# Patient Record
Sex: FEMALE | Race: WHITE | NOT HISPANIC OR LATINO | Employment: OTHER | URBAN - METROPOLITAN AREA
[De-identification: names, ages, dates, MRNs, and addresses within clinical notes are randomized per-mention and may not be internally consistent; named-entity substitution may affect disease eponyms.]

---

## 2017-05-02 ENCOUNTER — ALLSCRIPTS OFFICE VISIT (OUTPATIENT)
Dept: OTHER | Facility: OTHER | Age: 72
End: 2017-05-02

## 2017-05-02 DIAGNOSIS — Z12.31 ENCOUNTER FOR SCREENING MAMMOGRAM FOR MALIGNANT NEOPLASM OF BREAST: ICD-10-CM

## 2018-01-12 VITALS
BODY MASS INDEX: 29.44 KG/M2 | WEIGHT: 160 LBS | SYSTOLIC BLOOD PRESSURE: 124 MMHG | HEIGHT: 62 IN | DIASTOLIC BLOOD PRESSURE: 76 MMHG

## 2018-05-09 ENCOUNTER — ANNUAL EXAM (OUTPATIENT)
Dept: OBGYN CLINIC | Facility: MEDICAL CENTER | Age: 73
End: 2018-05-09
Payer: MEDICARE

## 2018-05-09 VITALS
DIASTOLIC BLOOD PRESSURE: 70 MMHG | BODY MASS INDEX: 29.3 KG/M2 | SYSTOLIC BLOOD PRESSURE: 128 MMHG | WEIGHT: 159.2 LBS | HEIGHT: 62 IN

## 2018-05-09 DIAGNOSIS — Z01.419 ENCNTR FOR GYN EXAM (GENERAL) (ROUTINE) W/O ABN FINDINGS: Primary | ICD-10-CM

## 2018-05-09 DIAGNOSIS — Z12.31 ENCOUNTER FOR SCREENING MAMMOGRAM FOR MALIGNANT NEOPLASM OF BREAST: ICD-10-CM

## 2018-05-09 DIAGNOSIS — Z78.0 POSTMENOPAUSAL: ICD-10-CM

## 2018-05-09 PROCEDURE — G0101 CA SCREEN;PELVIC/BREAST EXAM: HCPCS | Performed by: OBSTETRICS & GYNECOLOGY

## 2018-05-09 RX ORDER — TRIMETHOPRIM 100 MG/1
TABLET ORAL
COMMUNITY
Start: 2011-09-06 | End: 2019-09-10

## 2018-05-09 RX ORDER — FENOFIBRATE 160 MG/1
TABLET ORAL
COMMUNITY

## 2018-05-09 RX ORDER — PERPHENAZINE/AMITRIPTYLINE HCL 2 MG-25 MG
TABLET ORAL
COMMUNITY

## 2018-05-09 RX ORDER — VITAMIN E (DL,TOCOPHERYL ACET) 180 MG
CAPSULE ORAL
COMMUNITY

## 2018-05-09 RX ORDER — MULTIVITAMIN WITH IRON
1 TABLET ORAL DAILY
COMMUNITY

## 2018-05-09 RX ORDER — LISINOPRIL 20 MG/1
TABLET ORAL
COMMUNITY
Start: 2011-07-11

## 2018-05-09 RX ORDER — AZELASTINE 1 MG/ML
1 SPRAY, METERED NASAL 2 TIMES DAILY
COMMUNITY

## 2018-05-09 RX ORDER — FLUOCINONIDE 1 MG/G
CREAM TOPICAL 2 TIMES DAILY
COMMUNITY
Start: 2016-04-01 | End: 2019-09-10

## 2018-05-09 RX ORDER — OMEPRAZOLE 20 MG/1
CAPSULE, DELAYED RELEASE ORAL
COMMUNITY
Start: 2011-06-16

## 2018-05-09 RX ORDER — CHLORAL HYDRATE 500 MG
CAPSULE ORAL
COMMUNITY

## 2018-05-09 RX ORDER — MAG HYDROX/ALUMINUM HYD/SIMETH 400-400-40
SUSPENSION, ORAL (FINAL DOSE FORM) ORAL
COMMUNITY
End: 2019-05-14

## 2018-05-09 NOTE — PROGRESS NOTES
ASSESSMENT & PLAN: Keshia was seen today for gynecologic exam     Diagnoses and all orders for this visit:    Encntr for gyn exam (general) (routine) w/o abn findings    Encounter for screening mammogram for malignant neoplasm of breast  -     Mammo screening bilateral w cad; Future    Postmenopausal  -     DXA bone density spine hip and pelvis; Future    Discussion/Summary: reviewed potential gyn emergencies; Rxs given for screening mammogram and DEXA Scan; due to history of left breast cancer, recommend yearly visits  1  Routine well woman exam done today  2   Pap and HPV:  Pap and cotesting was not done today  Current ASCCP Guidelines reviewed  3   Mammogram was ordered  4   Colonoscopy is up to date  5   DEXA is not up to date  DEXA was ordered today  6  The following were reviewed in today's visit: breast self exam   7   F/u 1year      CC:  Annual Gynecologic Examination    HPI: Tere Billings is a 71 y o  who presents for annual gynecologic examination  She has the following concerns:  Weight, but she is working on healthy lifestyle changes     Health Maintenance:    Patient describes her health as good  Patient has weight concerns  She exercises 7 days per week with gym and walking  She does wears her seatbelt routinely  She does perform regular monthly self breast exams  She does feel safe at home  Patients does not follow a  diet  Patient gets 5 servings of dairy or calcium rich foods daily  Last pap: 2013  Last mammogram: 2017  Last colonoscopy: 2016    Patient Active Problem List   Diagnosis    Breast cancer ([address] )    Malignant neoplasm of upper-outer quadrant of left female breast [facility])       Past Medical History:   Diagnosis Date    No known problems        Past Surgical History:   Procedure Laterality Date    BREAST LUMPECTOMY Left        Past OB/Gyn History:    Patient is currently sexually active     monogamous      Family History   Problem Relation Age of Onset    Ovarian cancer Mother     No Known Problems Father        Social History:   Social History     Social History    Marital status: /Civil Union     Spouse name: N/A    Number of children: N/A    Years of education: N/A     Occupational History    Not on file  Social History Main Topics    Smoking status: Never Smoker    Smokeless tobacco: Never Used    Alcohol use No    Drug use: No    Sexual activity: Not on file     Other Topics Concern    Not on file     Social History Narrative    No narrative on file     Presently lives with   Patient is currently unemployed , retired from teaching      Allergies   Allergen Reactions    Aspirin Other (See Comments)     GI UPSET, NOT TRUE ALLERGY    Iodinated Diagnostic Agents     Sulfa Antibiotics     Wound Dressings Rash         Current Outpatient Prescriptions:     azelastine (ASTELIN) 0 1 % nasal spray, 1 spray into each nostril 2 (two) times a day Use in each nostril as directed, Disp: , Rfl:     Fluocinonide 0 1 % CREA, Apply topically Twice daily, Disp: , Rfl:     lisinopril (ZESTRIL) 20 mg tablet, Take by mouth, Disp: , Rfl:     metoprolol tartrate (LOPRESSOR) 25 mg tablet, Take by mouth, Disp: , Rfl:     omeprazole (PriLOSEC) 20 mg delayed release capsule, Take by mouth, Disp: , Rfl:     trimethoprim (PROLOPRIM) 100 mg tablet, Take by mouth, Disp: , Rfl:     Cholecalciferol (VITAMIN D3) 5000 units CAPS, Take by mouth, Disp: , Rfl:     fenofibrate (TRIGLIDE) 160 MG tablet, Take by mouth, Disp: , Rfl:     Flaxseed, Linseed, (FLAX SEED OIL) 1300 MG CAPS, Take by mouth, Disp: , Rfl:     Magnesium Oxide 500 MG CAPS, Take by mouth, Disp: , Rfl:     Omega-3 1000 MG CAPS, Take by mouth, Disp: , Rfl:     pyridoxine (VITAMIN B6) 100 mg tablet, Take 1 tablet by mouth daily, Disp: , Rfl:     Review of Systems  Constitutional :no fever, feels well, no tiredness, no recent weight gain or loss  ENT: no ear ache, no loss of hearing, no nosebleeds or nasal discharge, no sore throat or hoarseness  Cardiovascular: no complaints of slow or fast heart beat, no chest pain, no palpitations, no leg claudication or lower extremity edema  Respiratory: no complaints of shortness of shortness of breath, no LIZAMA  Breasts:no complaints of breast pain, breast lump, or nipple discharge  Gastrointestinal: no complaints of abdominal pain, constipation, nausea, vomiting, or diarrhea or bloody stools  Genitourinary : no complaints of dysuria, incontinence, pelvic pain, no dysmenorrhea, vaginal discharge or abnormal vaginal bleeding and as noted in HPI  Musculoskeletal: no complaints of arthralgia, no myalgia, no joint swelling or stiffness, no limb pain or swelling  Integumentary: no complaints of skin rash or lesion, itching or dry skin  Neurological: no complaints of headache, no confusion, no numbness or tingling, no dizziness or fainting     Physical Exam:   /70   Ht 5' 2" (1 575 m)   Wt 72 2 kg (159 lb 3 2 oz)   LMP  (LMP Unknown)   Breastfeeding? No   BMI 29 12 kg/m²     General:   appears stated age, cooperative, alert normal mood and affect   Psychiatric oriented to person, place and time  Mood and affect normal   Neck: normal, supple,trachea midline, no masses  Thyroid: normal, no thyromegaly   Heart: regular rate and rhythm, S1, S2 normal, no murmur, click, rub or gallop   Lungs: clear to auscultation bilaterally, no increased work of breathing or signs of respiratory distress   Breasts: normal, no dimpling or skin changes noted   Abdomen: soft, non-tender, without masses or organomegaly   Vulva: normal , no lesions   Vagina: normal , no lesions or dryness   Urethra: normal   Urethal meatus normal   Bladder Normal, soft, non-tender and no prolapse or masses appreciated   Cervix: normal, no palpable masses    Uterus: normal , non-tender, not enlarged, no palpable masses   Adnexa: normal, non-tender without fullness or masses;hemoccult neg  Lymphatic Palpation of lymph nodes in neck, axilla, groin and/or other locations: no lymphadenopathy or masses noted   Skin Normal skin turgor and no rashes    Palpation of skin and subcutaneous tissue normal

## 2018-09-10 ENCOUNTER — HOSPITAL ENCOUNTER (OUTPATIENT)
Dept: BONE DENSITY | Facility: MEDICAL CENTER | Age: 73
Discharge: HOME | End: 2018-09-10
Payer: MEDICARE

## 2018-09-10 DIAGNOSIS — Z78.0 POSTMENOPAUSAL: ICD-10-CM

## 2018-09-10 PROCEDURE — 77080 DXA BONE DENSITY AXIAL: CPT

## 2018-09-19 ENCOUNTER — TELEPHONE (OUTPATIENT)
Dept: OBGYN CLINIC | Facility: MEDICAL CENTER | Age: 73
End: 2018-09-19

## 2018-09-19 NOTE — TELEPHONE ENCOUNTER
Spoke with pt  And  about recent DEXA Scan results; to consider Prolia, but presently, pt  Very active and reluctant to take calcium due to history of kidney stones

## 2019-05-14 ENCOUNTER — ANNUAL EXAM (OUTPATIENT)
Dept: OBGYN CLINIC | Facility: MEDICAL CENTER | Age: 74
End: 2019-05-14
Payer: MEDICARE

## 2019-05-14 VITALS
DIASTOLIC BLOOD PRESSURE: 70 MMHG | BODY MASS INDEX: 26.81 KG/M2 | WEIGHT: 145.7 LBS | SYSTOLIC BLOOD PRESSURE: 128 MMHG | HEIGHT: 62 IN

## 2019-05-14 DIAGNOSIS — Z12.31 ENCOUNTER FOR SCREENING MAMMOGRAM FOR MALIGNANT NEOPLASM OF BREAST: ICD-10-CM

## 2019-05-14 DIAGNOSIS — Z01.419 ENCNTR FOR GYN EXAM (GENERAL) (ROUTINE) W/O ABN FINDINGS: Primary | ICD-10-CM

## 2019-05-14 PROCEDURE — G0101 CA SCREEN;PELVIC/BREAST EXAM: HCPCS | Performed by: OBSTETRICS & GYNECOLOGY

## 2019-05-14 RX ORDER — TRIAMCINOLONE ACETONIDE 1 MG/G
CREAM TOPICAL
COMMUNITY
Start: 2019-04-28 | End: 2020-05-20

## 2019-09-10 ENCOUNTER — OFFICE VISIT (OUTPATIENT)
Dept: OBGYN CLINIC | Facility: MEDICAL CENTER | Age: 74
End: 2019-09-10
Payer: MEDICARE

## 2019-09-10 VITALS
HEIGHT: 62 IN | SYSTOLIC BLOOD PRESSURE: 124 MMHG | DIASTOLIC BLOOD PRESSURE: 74 MMHG | BODY MASS INDEX: 29.17 KG/M2 | WEIGHT: 158.5 LBS

## 2019-09-10 DIAGNOSIS — N83.202 OVARIAN CYST, LEFT: Primary | ICD-10-CM

## 2019-09-10 DIAGNOSIS — N83.209 CYST OF OVARY, UNSPECIFIED LATERALITY: ICD-10-CM

## 2019-09-10 PROCEDURE — 99213 OFFICE O/P EST LOW 20 MIN: CPT | Performed by: OBSTETRICS & GYNECOLOGY

## 2019-09-10 RX ORDER — DIPHENHYDRAMINE HCL 50 MG
CAPSULE ORAL
Refills: 0 | COMMUNITY
Start: 2019-07-18

## 2019-09-10 RX ORDER — DIPHENHYDRAMINE HYDROCHLORIDE 25 MG/1
CAPSULE ORAL
Refills: 0 | COMMUNITY
Start: 2019-08-23

## 2019-09-10 RX ORDER — POTASSIUM CHLORIDE 1500 MG/1
20 TABLET, FILM COATED, EXTENDED RELEASE ORAL DAILY
COMMUNITY
Start: 2019-08-08

## 2019-09-10 RX ORDER — CHLORTHALIDONE 25 MG/1
25 TABLET ORAL DAILY
COMMUNITY
Start: 2019-08-08

## 2019-09-10 RX ORDER — CHOLECALCIFEROL (VITAMIN D3) 25 MCG
1000 CAPSULE ORAL 3 TIMES WEEKLY
COMMUNITY

## 2019-09-10 RX ORDER — SODIUM FLUORIDE 6 MG/ML
PASTE, DENTIFRICE DENTAL
Refills: 10 | COMMUNITY
Start: 2019-07-17

## 2019-09-10 RX ORDER — PREDNISONE 50 MG/1
50 TABLET ORAL
COMMUNITY
Start: 2019-08-23

## 2019-09-10 NOTE — PROGRESS NOTES
Patient and , Chata Puckett here to discuss recent labs and diagnostic studies which were done on [place]; I reviewed all labs and [address], gave and Rx for a pelvic u s ; in addition, her mother had ovarian cancer andnI suggested with next blood draw, which is tomorrow, to have a CA-125 (Rx given)  Will heed results  Also gave her a printed pamphlet entitled "Ovarian Cysts" (ACOG)  15 moinute 100 % discussion also in presence of maria eugenia Moore a  Student, Jhony  Will notify her when results become available

## 2019-09-11 ENCOUNTER — HOSPITAL ENCOUNTER (OUTPATIENT)
Dept: RADIOLOGY | Age: 74
Discharge: HOME | End: 2019-09-11
Payer: MEDICARE

## 2019-09-11 DIAGNOSIS — N83.209 CYST OF OVARY, UNSPECIFIED LATERALITY: ICD-10-CM

## 2019-09-11 PROCEDURE — 76830 TRANSVAGINAL US NON-OB: CPT

## 2019-09-11 PROCEDURE — 76856 US EXAM PELVIC COMPLETE: CPT

## 2019-09-13 ENCOUNTER — TELEPHONE (OUTPATIENT)
Dept: OBGYN CLINIC | Facility: MEDICAL CENTER | Age: 74
End: 2019-09-13

## 2019-09-17 ENCOUNTER — TELEPHONE (OUTPATIENT)
Dept: OBGYN CLINIC | Facility: MEDICAL CENTER | Age: 74
End: 2019-09-17

## 2019-09-17 NOTE — TELEPHONE ENCOUNTER
I called and left message ion home and cell number about recent pelvic u s  And CA-125; any questions, to return my call

## 2020-05-20 ENCOUNTER — ANNUAL EXAM (OUTPATIENT)
Dept: OBGYN CLINIC | Facility: MEDICAL CENTER | Age: 75
End: 2020-05-20
Payer: MEDICARE

## 2020-05-20 VITALS
BODY MASS INDEX: 29.87 KG/M2 | HEIGHT: 62 IN | WEIGHT: 162.3 LBS | SYSTOLIC BLOOD PRESSURE: 118 MMHG | DIASTOLIC BLOOD PRESSURE: 68 MMHG

## 2020-05-20 DIAGNOSIS — N83.202 OVARIAN CYST, LEFT: ICD-10-CM

## 2020-05-20 DIAGNOSIS — Z12.31 ENCOUNTER FOR SCREENING MAMMOGRAM FOR MALIGNANT NEOPLASM OF BREAST: ICD-10-CM

## 2020-05-20 DIAGNOSIS — Z85.3 HISTORY OF LEFT BREAST CANCER: ICD-10-CM

## 2020-05-20 DIAGNOSIS — Z78.0 ENCOUNTER FOR OSTEOPOROSIS SCREENING IN ASYMPTOMATIC POSTMENOPAUSAL PATIENT: ICD-10-CM

## 2020-05-20 DIAGNOSIS — Z01.419 ENCNTR FOR GYN EXAM (GENERAL) (ROUTINE) W/O ABN FINDINGS: Primary | ICD-10-CM

## 2020-05-20 DIAGNOSIS — Z13.820 ENCOUNTER FOR OSTEOPOROSIS SCREENING IN ASYMPTOMATIC POSTMENOPAUSAL PATIENT: ICD-10-CM

## 2020-05-20 PROBLEM — C64.9 CLEAR CELL CARCINOMA OF KIDNEY (HCC): Status: ACTIVE | Noted: 2020-05-20

## 2020-05-20 PROBLEM — Z08 ENCOUNTER FOR FOLLOW-UP SURVEILLANCE OF BREAST CANCER: Status: ACTIVE | Noted: 2019-04-02

## 2020-05-20 PROBLEM — E78.00 HYPERCHOLESTEREMIA: Status: ACTIVE | Noted: 2020-05-20

## 2020-05-20 PROCEDURE — G0101 CA SCREEN;PELVIC/BREAST EXAM: HCPCS | Performed by: OBSTETRICS & GYNECOLOGY

## 2020-05-20 RX ORDER — HYDROCHLOROTHIAZIDE 12.5 MG/1
12.5 TABLET ORAL DAILY
COMMUNITY